# Patient Record
Sex: MALE | Race: WHITE | NOT HISPANIC OR LATINO | Employment: FULL TIME | URBAN - METROPOLITAN AREA
[De-identification: names, ages, dates, MRNs, and addresses within clinical notes are randomized per-mention and may not be internally consistent; named-entity substitution may affect disease eponyms.]

---

## 2017-03-29 ENCOUNTER — DOCUMENTATION (OUTPATIENT)
Dept: RADIATION ONCOLOGY | Facility: HOSPITAL | Age: 42
End: 2017-03-29

## 2017-04-05 ENCOUNTER — OFFICE VISIT (OUTPATIENT)
Dept: RADIATION ONCOLOGY | Facility: HOSPITAL | Age: 42
End: 2017-04-05

## 2017-04-05 ENCOUNTER — APPOINTMENT (OUTPATIENT)
Dept: RADIATION ONCOLOGY | Facility: HOSPITAL | Age: 42
End: 2017-04-05

## 2017-04-05 VITALS
HEART RATE: 72 BPM | BODY MASS INDEX: 25.09 KG/M2 | DIASTOLIC BLOOD PRESSURE: 70 MMHG | WEIGHT: 185 LBS | OXYGEN SATURATION: 94 % | RESPIRATION RATE: 16 BRPM | TEMPERATURE: 97.4 F | SYSTOLIC BLOOD PRESSURE: 106 MMHG

## 2017-04-05 DIAGNOSIS — E05.00 OPHTHALMIC GRAVES DISEASE: Primary | ICD-10-CM

## 2017-04-05 PROCEDURE — G0463 HOSPITAL OUTPT CLINIC VISIT: HCPCS | Performed by: RADIOLOGY

## 2017-04-05 PROCEDURE — 99243 OFF/OP CNSLTJ NEW/EST LOW 30: CPT | Performed by: RADIOLOGY

## 2017-04-05 PROCEDURE — 77263 THER RADIOLOGY TX PLNG CPLX: CPT | Performed by: RADIOLOGY

## 2017-04-05 PROCEDURE — 77290 THER RAD SIMULAJ FIELD CPLX: CPT | Performed by: RADIOLOGY

## 2017-04-05 PROCEDURE — 77334 RADIATION TREATMENT AID(S): CPT | Performed by: RADIOLOGY

## 2017-04-07 PROCEDURE — 77295 3-D RADIOTHERAPY PLAN: CPT | Performed by: RADIOLOGY

## 2017-04-10 PROCEDURE — 77280 THER RAD SIMULAJ FIELD SMPL: CPT | Performed by: RADIOLOGY

## 2017-04-10 PROCEDURE — 77300 RADIATION THERAPY DOSE PLAN: CPT | Performed by: RADIOLOGY

## 2017-04-10 PROCEDURE — 77332 RADIATION TREATMENT AID(S): CPT | Performed by: RADIOLOGY

## 2017-04-10 PROCEDURE — 77427 RADIATION TX MANAGEMENT X5: CPT | Performed by: RADIOLOGY

## 2017-04-10 PROCEDURE — 77417 THER RADIOLOGY PORT IMAGE(S): CPT | Performed by: RADIOLOGY

## 2017-04-10 PROCEDURE — 77412 RADIATION TX DELIVERY LVL 3: CPT | Performed by: RADIOLOGY

## 2017-04-11 PROCEDURE — 77412 RADIATION TX DELIVERY LVL 3: CPT | Performed by: RADIOLOGY

## 2017-04-12 PROCEDURE — 77412 RADIATION TX DELIVERY LVL 3: CPT | Performed by: RADIOLOGY

## 2017-04-13 PROCEDURE — 77336 RADIATION PHYSICS CONSULT: CPT | Performed by: RADIOLOGY

## 2017-04-13 PROCEDURE — 77412 RADIATION TX DELIVERY LVL 3: CPT | Performed by: RADIOLOGY

## 2017-04-14 PROCEDURE — 77412 RADIATION TX DELIVERY LVL 3: CPT | Performed by: RADIOLOGY

## 2017-04-17 ENCOUNTER — RADIATION ONCOLOGY WEEKLY ASSESSMENT (OUTPATIENT)
Dept: RADIATION ONCOLOGY | Facility: HOSPITAL | Age: 42
End: 2017-04-17

## 2017-04-17 DIAGNOSIS — E05.00 OPHTHALMIC GRAVES DISEASE: Primary | ICD-10-CM

## 2017-04-17 PROCEDURE — 77412 RADIATION TX DELIVERY LVL 3: CPT | Performed by: RADIOLOGY

## 2017-04-17 PROCEDURE — 77417 THER RADIOLOGY PORT IMAGE(S): CPT | Performed by: RADIOLOGY

## 2017-04-17 PROCEDURE — 77427 RADIATION TX MANAGEMENT X5: CPT | Performed by: RADIOLOGY

## 2017-04-17 NOTE — PROGRESS NOTES
"Physician Weekly Management Note    Diagnosis:     Diagnosis Plan   1. Ophthalmic Graves disease         RT Details:    Treatment   #6/10    Notes on Treatment course, Films, Medical progress:  No questions or complaints.  No visual changes.  Stable proptosis on exam.  Checked field edge on table.  Continue as planned.    Weekly Management:  Medication reviewed?   Yes  New medications given?   No  Problemlist reviewed?   Yes  Problem added?   No      Technical aspects reviewed:  Weekly OBI approved?   Yes  Weekly port films approved?   Yes  Change requests noted on port film?   No  Patient setup and plan reviewed?   Yes    Chart Reviewed:  Continue current treatment plan?   Yes  Treatment plan change requested?   No  CBC reviewed?   No  Concurrent Chemo?   No    Objective     Toxicities:     As above     Review of Systems    As above    There were no vitals filed for this visit.    No flowsheet data found.    Physical Exam   As above      Problem Summary List    Diagnosis:     Diagnosis Plan   1. Ophthalmic Graves disease       Pathology:       Past Medical History:   Diagnosis Date   • Exophthalmos    • Graves disease    • Headache    • High cholesterol    • Kidney stones          Past Surgical History:   Procedure Laterality Date   • ABDOMINAL SURGERY      REMOVE \"CHOLESTEROL\"   • ENTROPIAN REPAIR Bilateral 4/7/2016    Procedure: SHAMIKA UPPER LID RETRACTION REPAIR W/ SCLERA /W;  Surgeon: Yovany Brooks MD;  Location: [facility], [place], operated by [facility];  Service:    • EYE SURGERY      SHAMIKA 2/2016   • ORBITAL DECOMPRESSION Right 5/26/2016    Procedure: RT LATERAL WALL DECOMPRESSION;  Surgeon: Prince Stephens MD;  Location: [facility], [place], operated by [facility];  Service:    • ORBITAL DECOMPRESSION Left 5/31/2016    Procedure: LT LATERAL WALL DECOMPRESSION;  Surgeon: Prince Stephens MD;  Location: [facility], [place], operated by [facility];  Service:          Current Outpatient Prescriptions on File Prior to Visit   Medication Sig Dispense Refill   • erythromycin (ROMYCIN) 5 " MG/GM ophthalmic ointment Apply to sutures twice a day 1 each 1   • erythromycin (ROMYCIN) ophthalmic ointment Apply to sutures twice a day 1 each 1   • simvastatin (ZOCOR) 40 MG tablet Take 40 mg by mouth every night.       No current facility-administered medications on file prior to visit.        Allergies   Allergen Reactions   • Prednisone Rash     Pt states breaking out in rash and palpatations         Primary care MD:    Snehal Rich MD    Oncologist:      Seen and approved by:  Kraig Parmar Jr., MD  04/17/2017

## 2017-04-18 PROCEDURE — 77412 RADIATION TX DELIVERY LVL 3: CPT | Performed by: RADIOLOGY

## 2017-04-19 PROCEDURE — 77412 RADIATION TX DELIVERY LVL 3: CPT | Performed by: RADIOLOGY

## 2017-04-20 PROCEDURE — 77412 RADIATION TX DELIVERY LVL 3: CPT | Performed by: RADIOLOGY

## 2017-04-20 PROCEDURE — 77336 RADIATION PHYSICS CONSULT: CPT | Performed by: RADIOLOGY

## 2017-04-21 ENCOUNTER — DOCUMENTATION (OUTPATIENT)
Dept: RADIATION ONCOLOGY | Facility: HOSPITAL | Age: 42
End: 2017-04-21

## 2017-04-21 PROCEDURE — 77412 RADIATION TX DELIVERY LVL 3: CPT | Performed by: RADIOLOGY

## 2017-04-21 NOTE — PROGRESS NOTES
Subjective     COMPLETION / CLOSURE NOTE      Diagnosis: Graves disease      Treatment Course:   20 GY IN 10 FRACTIONS  Dates of treatment:  4/10/2017 - 4/20/2017.  Treatment Site - bilateral orbits    Treatment Intent - Curative, Postoperative  Total Dose in cGy - 2000  Number of Treatments - 10  Dose per fraction - 200 cGy per fraction  Fractions per day - 1 fx/day  Fractions per week - 5 fx/week  Treatment Type - 3 fields , 3D  Energy - 6 MVP, 18 MVP  Normalization - Calc point, Prescribed at 90%  Imaging/Field Verification - Simulation before first treatment to verify field, blocking, placement and positioning, Simulation for all ports of change, Weekly port films of all ports  Additional comments: - 2-d bony match  1cm bolus  will rescan and replan if proptosis lessens      Tolerance:  Tolerated the above treatments well without any immediate treatment related issues, and completed the total treatments in 11  elapsed days    Disposition:  Followup PRN only.      Kraig Parmar Jr., MD      Electronically Signed By:   Kraig Parmar Jr., MD    4/21/2017      CC:

## 2017-04-22 ENCOUNTER — HOSPITAL ENCOUNTER (EMERGENCY)
Dept: HOSPITAL 23 - CFTX | Age: 42
Discharge: HOME | End: 2017-04-22
Payer: COMMERCIAL

## 2017-04-22 DIAGNOSIS — E05.00: ICD-10-CM

## 2017-04-22 DIAGNOSIS — H10.31: ICD-10-CM

## 2017-04-22 DIAGNOSIS — F17.210: ICD-10-CM

## 2017-04-22 DIAGNOSIS — Z98.890: ICD-10-CM

## 2017-04-22 DIAGNOSIS — R51: Primary | ICD-10-CM

## 2017-05-17 ENCOUNTER — HOSPITAL ENCOUNTER (EMERGENCY)
Dept: HOSPITAL 23 - CED | Age: 42
Discharge: HOME | End: 2017-05-17
Payer: COMMERCIAL

## 2017-05-17 DIAGNOSIS — M79.1: Primary | ICD-10-CM

## 2017-05-17 LAB
BARBITURATES UR QL SCN: 0.6 MG/DL (ref 0.2–2)
BARBITURATES UR QL SCN: 4.1 G/DL (ref 3.5–5)
BASOPHIL#: 0 X10E3 (ref 0–0.3)
BASOPHIL%: 0.4 % (ref 0–2.5)
BENZODIAZ UR QL SCN: 14 U/L (ref 10–42)
BENZODIAZ UR QL SCN: 16 U/L (ref 10–40)
BLOOD UREA NITROGEN: 23 MG/DL (ref 9–23)
BUN/CREATININE RATIO: 25.55
BZE UR QL SCN: 53 U/L (ref 32–92)
CALCIUM SERUM: 9.1 MG/DL (ref 8.4–10.2)
CK MB SERPL-RTO: 13 % (ref 11–15.5)
CK MB SERPL-RTO: 33.1 G/DL (ref 30–36)
CREATININE SERUM: 0.9 MG/DL (ref 0.6–1.4)
DIFF IND: NO
EOSINOPHIL#: 0.3 X10E3 (ref 0–0.7)
EOSINOPHIL%: 3 % (ref 0–7)
GLOM FILT RATE ESTIMATED: 105 ML/MIN (ref 60–?)
GLUCOSE FASTING: 133 MG/DL (ref 70–110)
HEMATOCRIT: 43.2 % (ref 38–50)
HEMOGLOBIN: 14.3 GM/DL (ref 13–16)
KETONES UR QL: 108 MMOL/L (ref 100–111)
KETONES UR QL: 26 MMOL/L (ref 22–31)
LYMPHOCYTE#: 2.4 X10E3 (ref 1–3.5)
LYMPHOCYTE%: 21.8 % (ref 17–45)
MEAN CELL VOLUME: 84.4 FL (ref 83–96)
MEAN CORPUSCULAR HEMOGLOBIN: 27.9 PG (ref 28–34)
MEAN PLATELET VOLUME: 9.2 FL (ref 6.5–11.5)
METHADONE UR QL SCN: <1 NG/ML (ref 0–7.9)
MONOCYTE#: 0.6 X10E3 (ref 0–1)
MONOCYTE%: 5.1 % (ref 3–12)
NEUTROPHIL#: 7.6 X10E3 (ref 1.5–7.1)
NEUTROPHIL%: 69.7 % (ref 40–75)
PLATELET COUNT: 180 X10E3 (ref 140–420)
POC - TROPONIN: <0.05 NG/ML (ref ?–0.05)
POTASSIUM: 3.6 MMOL/L (ref 3.5–5.1)
PROTEIN TOTAL SERUM: 6.9 G/DL (ref 6–8.3)
RED BLOOD COUNT: 5.12 X10E (ref 3.9–5.6)
SODIUM: 140 MMOL/L (ref 135–145)
WHITE BLOOD COUNT: 10.9 X10E3 (ref 4–10.5)

## 2021-02-11 ENCOUNTER — TELEPHONE (OUTPATIENT)
Dept: ORTHOPEDIC SURGERY | Facility: CLINIC | Age: 46
End: 2021-02-11

## 2021-02-11 NOTE — TELEPHONE ENCOUNTER
Caller: JUSTINE GAR   Relationship to Patient: [employer]     Phone Number: [phone]  Reason for Call: [employer]  CALLING ABOUT WANTING TO GET INTO CONTACT WITH SOMEONE THAT SPECIFICALLY DEALS WITH [employer] PATIENTS

## 2021-02-18 ENCOUNTER — TELEPHONE (OUTPATIENT)
Dept: ORTHOPEDIC SURGERY | Facility: CLINIC | Age: 46
End: 2021-02-18

## 2021-02-18 NOTE — TELEPHONE ENCOUNTER
DR REYNA RECEIVED CALL FROM JUSTINE GAR  ADJ, HE WANTED TO LET YOU KNOW THAT THE PATIENT WAS TREATING WITH DR KELLY, SHE PUT HIM AT [facility] ON 11-06-20, TO RETURN PRN, HIS MEDICAL RECORDS ARE IN MEDIA MGR FOR YOU TO REVIEW, LET ME KNOW IF YOU DO NOT WANT TO SEE THIS PATIENT, THANKS VANIA

## 2021-02-18 NOTE — TELEPHONE ENCOUNTER
I SPOKE WITH JUSTINE GAR , [id number], AND CONVEYED, THAT [facility] PROBABLY DOESN'T HAVE ANYTHING TO OFFER HIM, SINCE DR KELLY HAS PATIENT AT [facility],  THE PATIENTS  WANTS PT TO SEE MORALES,LLR

## 2021-03-03 ENCOUNTER — TELEPHONE (OUTPATIENT)
Dept: ORTHOPEDIC SURGERY | Facility: CLINIC | Age: 46
End: 2021-03-03

## 2021-03-03 ENCOUNTER — OFFICE VISIT (OUTPATIENT)
Dept: ORTHOPEDIC SURGERY | Facility: CLINIC | Age: 46
End: 2021-03-03

## 2021-03-03 VITALS — TEMPERATURE: 98 F | BODY MASS INDEX: 32.82 KG/M2 | HEIGHT: 65 IN | WEIGHT: 197 LBS

## 2021-03-03 DIAGNOSIS — S92.324A CLOSED NONDISPLACED FRACTURE OF SECOND METATARSAL BONE OF RIGHT FOOT, INITIAL ENCOUNTER: ICD-10-CM

## 2021-03-03 DIAGNOSIS — M77.41 METATARSALGIA OF RIGHT FOOT: ICD-10-CM

## 2021-03-03 DIAGNOSIS — R52 PAIN: Primary | ICD-10-CM

## 2021-03-03 DIAGNOSIS — S92.334A CLOSED NONDISPLACED FRACTURE OF THIRD METATARSAL BONE OF RIGHT FOOT, INITIAL ENCOUNTER: ICD-10-CM

## 2021-03-03 PROCEDURE — 99204 OFFICE O/P NEW MOD 45 MIN: CPT | Performed by: ORTHOPAEDIC SURGERY

## 2021-03-03 PROCEDURE — 73630 X-RAY EXAM OF FOOT: CPT | Performed by: ORTHOPAEDIC SURGERY

## 2021-03-03 NOTE — TELEPHONE ENCOUNTER
Caller: [facility] .  Reason For Call:  CALLING TO GET VISIT NOTES 3/3/21.  AND MRI ORDER- NEEDS TO BE APPROVED.     Caller# [phone]  Fax# [phone]

## 2021-03-03 NOTE — PROGRESS NOTES
"   New Patient Complaint      Patient: Sharon Back  YOB: 1975 45 y.o. male  Medical Record Number: [id number]    Chief Complaints: Foot hurts    History of Present Illness:    Patient injured his right foot in July 2020 when a 400 kg steel box fell on it.    He was treated by Dr. Phani arauz for second and third metatarsal fractures nonoperatively.  I have reviewed those notes extensively that were available and she last saw him according to her notes on 11/6/2020 at which time he indicated per her notes that his foot was doing much better and she released him at that time.    However he said that he had still had persistent complaints of pain indicating the area of the distal forefoot on the second and third metatarsals and inferolateral hindfoot along the area of the cuboid that has worsened with activity.        HPI    Allergies:   Allergies   Allergen Reactions   • Prednisone Rash     Pt states breaking out in rash and palpatations       Medications:   Current Outpatient Medications on File Prior to Visit   Medication Sig   • erythromycin (ROMYCIN) 5 MG/GM ophthalmic ointment Apply to sutures twice a day   • erythromycin (ROMYCIN) ophthalmic ointment Apply to sutures twice a day   • simvastatin (ZOCOR) 40 MG tablet Take 40 mg by mouth every night.     No current facility-administered medications on file prior to visit.        Past Medical History:   Diagnosis Date   • Exophthalmos    • Graves disease    • Headache    • High cholesterol    • Kidney stones      Past Surgical History:   Procedure Laterality Date   • ABDOMINAL SURGERY      REMOVE \"CHOLESTEROL\"   • ENTROPIAN REPAIR Bilateral 4/7/2016    Procedure: SHAMIKA UPPER LID RETRACTION REPAIR W/ SCLERA /W;  Surgeon: Yovany Brooks MD;  Location: [facility];  Service:    • EYE SURGERY      SHAMIKA 2/2016   • ORBITAL DECOMPRESSION Right 5/26/2016    Procedure: RT LATERAL WALL DECOMPRESSION;  Surgeon: Prince Stephens MD;  Location: [facility] OR " [facility];  Service:    • ORBITAL DECOMPRESSION Left 5/31/2016    Procedure: LT LATERAL WALL DECOMPRESSION;  Surgeon: Prince Stephens MD;  Location: [facility];  Service:      Social History     Occupational History   • Not on file   Tobacco Use   • Smoking status: Current Every Day Smoker     Packs/day: 2.00     Years: 25.00     Pack years: 50.00   • Smokeless tobacco: Never Used   Substance and Sexual Activity   • Alcohol use: No   • Drug use: No   • Sexual activity: Defer      Social History     Social History Narrative   • Not on file     Family History   Family history unknown: Yes       Review of Systems: 14 point review of systems performed, positive pertinent findings identified in HPI. All remaining systems negative     Review of Systems      Physical Exam:   Vitals:    03/03/21 0943   Temp: 98 °F (36.7 °C)   Weight: 89.4 kg (197 lb)   Height: 165.1 cm (65\")   PainSc:   5     Physical Exam   Constitutional: pleasant, well developed   Eyes: sclera non icteric  Hearing : adequate for exam  Cardiovascular: palpable pulses in right foot, right calf/ thigh NT without sign of DVT  Respiratoy: breathing unlabored   Neurological: grossly sensate to LT throughout right LE with no mottling or hyperesthesia or gross signs of RSD.  Psychiatric: oriented with normal mood and affect.   Lymphatic: No palpable popliteal lymphadenopathy right LE  Skin: intact throughout right leg/foot  Musculoskeletal: Does have some mild pes planus and has really minimal to no discomfort over the dorsum of the midfoot fractures.  There was moderate discomfort distally along the second more so than third metatarsal and moderate tenderness in the inferolateral hindfoot along the proximal aspect of the fourth and fifth metatarsals and cuboid.  Does have some mild pes planus.  Physical Exam  Ortho Exam    Radiology: 3 views of the right foot ordered evaluate fractures reviewed and no prior x-rays available for comparison there appears to " been previous fractures of the second and third metatarsals proximally the second being approximately 2 cm distal to the joint line in the third being about 17 to 18 mm proximal to the joint line.  I do not appreciate significant displacement I do not appreciate significant displacement there is some callus formation fracture lines are still somewhat evident.    There are some questionable area along the cuboid that may be fracture and also some questionable area over the distal second and third metatarsal it may be some cystic change.    Assessment/Plan: 1.  Right second and third metatarsal fracture status post crush injury with questionable healing  2.  Right forefoot metatarsalgia and inferolateral persistent hindfoot pain.    I would continue with accommodative shoes for now we need to get a CT scan of his right foot to make sure the fractures are healed.    Also need to get an MRI of his foot to evaluate the areas of pain which are not in the areas of his fracture to make sure there is any type of persistent fracture or stress reaction or other abnormality.    I discussed this with his  Yemi Foote will work on facilitating the studies.    We will see him back in about 3 weeks to review studies and determine treatment course going forward.

## 2021-03-04 NOTE — TELEPHONE ENCOUNTER
Caller: [facility] [employer] .    CALLING TO GET WRITTEN ORDER FOR CT AND MRI.    Caller# [phone]  Fax# [phone]

## 2021-03-04 NOTE — TELEPHONE ENCOUNTER
Spoke with Yemi earlier he said office note he had just needed the orders.  I faxed to him already.

## 2021-03-18 ENCOUNTER — OFFICE VISIT (OUTPATIENT)
Dept: ORTHOPEDIC SURGERY | Facility: CLINIC | Age: 46
End: 2021-03-18

## 2021-03-18 VITALS — WEIGHT: 197 LBS | TEMPERATURE: 98 F | HEIGHT: 65 IN | BODY MASS INDEX: 32.82 KG/M2

## 2021-03-18 DIAGNOSIS — M79.671 RIGHT FOOT PAIN: Primary | ICD-10-CM

## 2021-03-18 DIAGNOSIS — S92.334K CLOSED NONDISPLACED FRACTURE OF THIRD METATARSAL BONE OF RIGHT FOOT WITH NONUNION, SUBSEQUENT ENCOUNTER: ICD-10-CM

## 2021-03-18 DIAGNOSIS — S92.324K CLOSED NONDISPLACED FRACTURE OF SECOND METATARSAL BONE OF RIGHT FOOT WITH NONUNION, SUBSEQUENT ENCOUNTER: ICD-10-CM

## 2021-03-18 PROCEDURE — 99213 OFFICE O/P EST LOW 20 MIN: CPT | Performed by: ORTHOPAEDIC SURGERY

## 2021-03-18 PROCEDURE — 73630 X-RAY EXAM OF FOOT: CPT | Performed by: ORTHOPAEDIC SURGERY

## 2021-03-18 NOTE — PROGRESS NOTES
"Foot Follow Up      Patient: Sharon Back    YOB: 1975 45 y.o. male    Chief Complaints: Foot hurts    History of Present Illness: Patient was seen initially on 3/3/2021 after doing his right foot in July 2020 when a 400 kg steel box fell on it.    He had been treated by Dr. Cosme for second and third metatarsal fractures nonoperatively and reviewed those notes at our previous visit and according to those notes on 11/6/2020 her notes indicate that his foot was feeling better and she released him    However he said he has had persistent complaints of pain indicating the area of the distal forefoot at the second and third metatarsals and inferolateral hindfoot near the cuboid that has been going on now for 9 months and at that time had only limited discomfort in the dorsum of the midfoot which he does have to some degree today.    He been sent for CT and MRI and is seen back today for further evaluation without change in complaints.  HPI    ROS: Foot pain  Past Medical History:   Diagnosis Date   • Exophthalmos    • Graves disease    • Headache    • High cholesterol    • Kidney stones      Physical Exam:   Vitals:    03/18/21 0940   Temp: 98 °F (36.7 °C)   Weight: 89.4 kg (197 lb)   Height: 165.1 cm (65\")   PainSc:   5     Well developed with normal mood.  Exam he is ambulating without assistive device.  He had moderate discomfort of the inferolateral right midfoot/hindfoot area as well as mild discomfort over dorsum of the midfoot and into the forefoot plantarly more so than dorsally.  There was no sign of RSD and he was able to flex and extend his toes relatively well.      Radiology: 3 views of the right foot ordered evaluate pain reviewed and compared to prior x-rays from 3/3/2021.  There does appear to be persistent area of the fractures at the second and third metatarsals with partial union.  No obvious new fracture given the pain along the second and third metatarsals that he is having " distally now.    CT scan and MRI of the right foot from [facility] dated 3/11/2021 were reviewed which on CT shows fracture at the base of the second metatarsal which appears partly healed with some hypertrophic bone formation fracture line does remain somewhat visible but 1 to 2 cm with some bony bridging on the dorsal lateral aspect of the fracture and estimated healing at 50%.    Also fracture visualized on the proximal shaft of the third metatarsal which partly healed with small lucency along the medial aspect of the fracture and healing is estimated at least 50%.  There is marrow edema and irregularity visible on the MRI.    There is no evidence of cuboid fracture or other occult fracture there is a small lucency along the lateral aspect of the first metatarsal head suggesting a tiny cyst in the very small focus of calcification in the soft tissues along the plantar lateral aspect of the foot at the level of the proximal cuboid however there is no obvious fracture of the cuboid or pathology in the area of the lateral midfoot where he indicates pain and imaged portions of the distal peroneal tendons appeared intact.    MRI also indicated a small focus of fluid signal along the plantar margin of the fourth digit flexor tendon at the level of the MTP joint thought to be about an 8 mm x 4 to 5 mm synovial or ganglion cyst.    Assessment/Plan: 1.  Right second and third proximal metatarsal fractures with partial healing none   2.  Right inferolateral hindfoot pain without obvious pathology along the cuboid  3.  Right forefoot metatarsalgia without clear evidence of fracture.  4.  Right forefoot ganglion cyst at the level of the fourth MTP joint.    Had a long discussion with patient today that his situation is somewhat complex and that he is having some discomfort at the area of the fractures but cannot clearly explain the pain in the lateral midfoot or inferolateral hindfoot nor in the forefoot and surgery could be  "somewhat questionable with him as far as addressing any of those other painful areas    And reviewed with him that would want to get another opinion on him prior to proceeding with any surgical treatment.  He was instructed on obtaining a power step orthotic and discussed his case at length with his  and we decided to hold off on a bone stimulator as this could be a \"big ticket item until he gets another opinion.    He continues to work doing standing work but does not have to move much and will continue as such.    I spoke at length with his  Yemi Foote who is going to facilitate getting him in appointment for another opinion regarding treatment options with another foot and ankle specialist and has a good working relationship with Dr. John Barnes and will see if he can get him in for evaluation and Dr. Barnes's recommendations prior to any further intervention.  He will let me know when he is going to see him and will discuss treatment going forward.  "

## 2021-03-25 ENCOUNTER — TELEPHONE (OUTPATIENT)
Dept: ORTHOPEDIC SURGERY | Facility: CLINIC | Age: 46
End: 2021-03-25

## 2021-03-25 NOTE — TELEPHONE ENCOUNTER
Provider: DR. JUNE REYNA    Caller: JUSTINE AGR- WITH [facility]    Relationship to Patient: [employer]    Phone Number: [phone]    Reason for Call:  TO NOTIFY [facility] THAT PATIENT HAS APPT FOR 2ND SURGICAL OPINION FOR RIGHT FOOT WITH DR. KARY WEISS ON 4/12/21.